# Patient Record
Sex: MALE | Race: WHITE | Employment: FULL TIME | URBAN - METROPOLITAN AREA
[De-identification: names, ages, dates, MRNs, and addresses within clinical notes are randomized per-mention and may not be internally consistent; named-entity substitution may affect disease eponyms.]

---

## 2020-03-16 ENCOUNTER — VIRTUAL VISIT (OUTPATIENT)
Dept: FAMILY MEDICINE | Facility: OTHER | Age: 65
End: 2020-03-16

## 2020-03-16 ENCOUNTER — OFFICE VISIT - HEALTHEAST (OUTPATIENT)
Dept: FAMILY MEDICINE | Facility: CLINIC | Age: 65
End: 2020-03-16

## 2020-03-16 DIAGNOSIS — R05.9 COUGH: ICD-10-CM

## 2020-03-19 NOTE — PROGRESS NOTES
"Date: 2020 05:40:20  Clinician: Isabella Jarvis  Clinician NPI: [id number]  Patient: Ted Banuelos  Patient : 1955  Patient Address: [address] [place] [address]  Patient Phone: [phone]  Visit Protocol: URI  Patient Summary:  Ted is a 64 year old ( : 1955 ) male who initiated a Visit for COVID-19 (Coronavirus) evaluation and screening. When asked the question \"Please sign me up to receive news, health information and promotions. \", Ted responded \"Yes\".    Ted states his symptoms started suddenly 7-9 days ago.   His symptoms consist of malaise, a headache, rhinitis, enlarged lymph nodes, chills, wheezing, a cough, and tooth pain. He is experiencing mild difficulty breathing with activities but can speak normally in full sentences. Ted also feels feverish.   Symptom details     Nasal secretions: The color of his mucus is yellow and white.    Cough: Ted coughs every 5-10 minutes and his cough is not more bothersome at night. Phlegm comes into his throat when he coughs. He does not believe his cough is caused by post-nasal drip. The color of the phlegm is white and yellow.     Temperature: His current temperature is 92.4 degrees Fahrenheit.     Wheezing: Ted has not ever been diagnosed with asthma. The wheezing interferes with his normal daily activities.    Headache: He states the headache is moderate (4-6 on a 10 point pain scale).     Tooth pain: The tooth pain is not caused by a cavity, recent dental work, or other mouth problems.      Ted denies having ear pain, facial pain or pressure, myalgias, sore throat, and nasal congestion. He also denies having recent facial or sinus surgery in the past 60 days, double sickening (worsening symptoms after initial improvement), having a sinus infection within the past year, and taking antibiotic medication for the symptoms.   Precipitating events  He has not recently been exposed to someone with influenza. " Ted has been in close contact with the following high risk individuals: adults 65 or older and children under the age of 5.   Pertinent COVID-19 (Coronavirus) information  Ted has not traveled internationally or to the areas where COVID-19 (Coronavirus) is widespread in the last 14 days before the start of his symptoms.   Ted has not had close contact with a suspected or laboratory-confirmed COVID-19 patient within 14 days of symptom onset.   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   Triage Point(s) temporarily suspended for COVID-19 (Coronavirus) screening  Ted reported the following symptoms which were previously protocol referral points. These protocol referral points have temporarily been removed for purposes of COVID-19 (Coronavirus) screening.   Wheezing that keeps Ted from doing daily activities   Pertinent medical history  Ted does not need a return to work/school note.   Weight: 180 lbs   Ted does not smoke or use smokeless tobacco.   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 To do this follow these instructions:  You should go to one of our dedicated testing centers as soon as possible during the hours below at one of these locations:   Walk-in Care: [facility] at [address] [address], [place] [address]. Hours: M-F 7am - 6pm, Sat-Sun 8am -- 3pm  Walk-in Care: [facility] at [address] [place] [address]. Hours: M-F 7am - 6pm, Sat-Sun 8am -- 3pm  [place] [address], [place] [address]. Hours: M-F 11am -- 7pm, Sat-Sun 9am-4pm   What to expect:   When you arrive please come michelle in the parking lot.  Call [phone] and let them know which of the four clinics you are at, description of your car and where you are parked. Mention you did an [facility] visit and were sent for testing.  They will add you to the queue to get your test (you will stay in your car the entire time).  On that phone call you will give them the information to register your for the visit.  You will then be met by a provider who will perform a brief assessment in your car and collect samples to send for Corona Virus 19, influenza and possibly RSV.  You will be given patient information about respiratory illnesses and instructions. You with the results if you are not on mychart.   Isolate Yourself:   Isolate yourself while traveling.  Do Not allow any visitors within 6 feet.  Do Not go to work or school.  Do Not go to Yaz[employer],  centers, shopping, or other public places.  Do Not shake hands.  Avoid close contact with others (hugging, kissing).  Protect Others:  Cover Your Mouth and Nose with a mask, disposable tissue or wash cloth to avoid spreading germs to others.  Wash your hands and face frequently with soap and water   Fever Medicines:   For fever relief, take acetaminophen or ibuprofen.  Treat fevers above 101deg F (38.3deg C) to lower fevers and make you more comfortable.   Acetaminophen (e.g., Tylenol): Take 650 mg (two 325 mg pills) by mouth every 4-6 hours as needed of regular strength Tylenol or 1,000 mg (two 500 mg pills) every 8 hours as needed of Extra Strength Tylenol.   Ibuprofen (e.g., Motrin, Advil): Take 400 mg (two 200 mg pills) by mouth every 6 hours as needed.   Acetaminophen is thought to be safer than ibuprofen or naproxen for people over 65 years old. Acetaminophen is in many OTC and prescription medicines. It might be in more than one medicine that you are taking. You need to be  careful and not take an overdose. Before taking any medicine, read all the instructions on the package.  Caution -NSAIDs (e.g., ibuprofen, naproxen): Do not take nonsteroidal anti-inflammatory drugs (NSAIDs) if you have stomach problems, kidney disease, heart failure, or other contraindications to using this type of medicine. Do not take NSAID medicines for over 7 days without consulting your PCP. Do not take NSAID medicines if you are pregnant. Do not take NSAID medicines if you are also taking blood thinners.   Call Back If: Breathing difficulty develops or you become worse.  Thank you for limiting contact with others, wearing a simple mask to cover your cough, practice good hand hygiene habits and accessing our virtual services where possible to limit the spread of this virus.  For more information about COVID19 and options for caring for yourself at home, please visit the CDC website at https://www.cdc.gov/coronavirus/2019-ncov/about/steps-when-sick.html  For more options for care at [facility], please visit our website at [email or web address]    Diagnosis: Cough  Diagnosis ICD: R05

## 2020-03-22 ENCOUNTER — COMMUNICATION - HEALTHEAST (OUTPATIENT)
Dept: SCHEDULING | Facility: CLINIC | Age: 65
End: 2020-03-22

## 2021-01-20 ENCOUNTER — THERAPY VISIT (OUTPATIENT)
Dept: PHYSICAL THERAPY | Facility: CLINIC | Age: 66
End: 2021-01-20
Payer: OTHER MISCELLANEOUS

## 2021-01-20 DIAGNOSIS — S96.912A: ICD-10-CM

## 2021-01-20 PROCEDURE — 97110 THERAPEUTIC EXERCISES: CPT | Mod: GP | Performed by: PHYSICAL THERAPIST

## 2021-01-20 PROCEDURE — 97161 PT EVAL LOW COMPLEX 20 MIN: CPT | Mod: GP | Performed by: PHYSICAL THERAPIST

## 2021-01-20 NOTE — PROGRESS NOTES
[facility] Initial Evaluation  Subjective:  The history is provided by the patient. No  was used.   Patient Health History  Ted Banuelos being seen for Left Ankle and Left knee injury.     Problem began: 1/4/2021.   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   Red flags:  None as reported by patient.  Medical allergies: none.   Surgeries: pain, muscle relaxants.    Current medications:  None.    Current occupation is [employer] .   Primary job tasks include:  Prolonged standing, repetitive tasks, pushing/pulling, lifting/carrying and driving.                  Therapist Generated HPI Evaluation         Type of problem:  Left ankle (and Left Knee).    This is a new condition.  Condition occurred with:  A fall/slip.  Where condition occurred: at work.  Patient reports pain:  Anterior, lateral and posterior.  Pain is described as aching and cramping and is intermittent.  Pain radiates to:  Thigh (posterior thigh).   Since onset symptoms are gradually improving.  Associated symptoms:  Loss of motion/stiffness. Symptoms are exacerbated by walking, weight bearing and standing        Restrictions due to condition include:  Currently not working due to present treatment (pt follows up with MD on the 18thof February).  Barriers include:  None as reported by patient.                        Objective:  System    Ankle/Foot Evaluation  ROM:    AROM:    Dorsiflexion:  Left:   15*  Right:   10*  Plantarflexion:  Left:  60*    Right:  60*  Inversion:  Left:  40     Right:  40  Eversion:  30     Right:  30      PROM:                Pain: no pain with AROM.     Strength:    Dorsiflexion:  Left: 4/5   Strong/pain free    Pain:   Right: 5/5   Strong/pain free  Pain:  Plantarflexion: Left: 4+/5   Strong/pain free  Pain:   Right: 5/5  Strong/pain free  Pain:  Inversion:Left: 4/5   Strong/pain free  Pain:     Right: 5/5  Strong/pain free  Pain:  Eversion:Left: 4/5  Strong/pain free  Pain:  Right: 5/5  Strong/pain free  Pain:  Flexion Great Toe:Left: 5/5  Strong/pain free  Pain:  Right: 5/5   Strong/pain free  Pain:  Extension Great Toe:Left: 5/5  Strong/pain free  Pain:  Right: 5/5  Strong/pain free  Pain:                                                               Hip Evaluation  HIP AROM:  AROM:   Left Hip:     Normal    Right Hip:                      Hip Strength:      Extension:  Left: 3+/5   Pain:strong/pain freeRight: 4-/5     Pain: strong/pain free                               Knee Evaluation:  ROM:    AROM      Extension:  Left: 0    Right:  0  Flexion: Left: 120    Right: 140    Pain: pain with L knee flexion.     Strength:     Extension:  Left: 4+/5   Strong/pain free  Pain:      Right: 4+/5   Strong/pain free  Pain:  Flexion:  Left: 4+/5   Strong/pain free  Pain:      Right: 4+/5   Strong/pain free  Pain:    Quad Set Left:  Fair and delayed    Pain: -   Quad Set Right:  Good and WNL    Pain: -                  General     ROS    Assessment/Plan:    Patient is a 65 year old male with left side knee and left side ankle complaints.    Patient has the following significant findings with corresponding treatment plan.                Diagnosis 1:  Left Knee and Ankle Pain secondary to fall  Pain -  manual therapy, self management, education and home program  Decreased ROM/flexibility - manual therapy and therapeutic exercise  Decreased strength - therapeutic exercise and therapeutic activities  Impaired gait - gait training  Decreased function - therapeutic activities    Therapy Evaluation Codes:   1) History comprised of:   Personal factors that impact the plan of care:      None.    Comorbidity factors that impact the plan of care are:      none.     Medications impacting care: None.  2) Examination of Body Systems comprised of:   Body structures and functions that impact the plan of  care:      Ankle and Knee.   Activity limitations that impact the plan of care are:      standing, walking, lifting.  3) Clinical presentation characteristics are:   Stable/Uncomplicated.  4) Decision-Making    Low complexity using standardized patient assessment instrument and/or measureable assessment of functional outcome.  Cumulative Therapy Evaluation is: Low complexity.    Previous and current functional limitations:  (See Goal Flow Sheet for this information)    Short term and Long term goals: (See Goal Flow Sheet for this information)     Communication ability:  Patient appears to be able to clearly communicate and understand verbal and written communication and follow directions correctly.  Treatment Explanation - The following has been discussed with the patient:   RX ordered/plan of care  Anticipated outcomes  Possible risks and side effects  This patient would benefit from PT intervention to resume normal activities.   Rehab potential is excellent.    Frequency:  1 X week, once daily  Duration:  for 8 weeks  Discharge Plan:  Achieve all LTG.  Independent in home treatment program.  Reach maximal therapeutic benefit.    Please refer to the daily flowsheet for treatment today, total treatment time and time spent performing 1:1 timed codes.

## 2021-01-20 NOTE — LETTER
[facility] PHYSICAL THERAPY  [address]  [facility] [address]  [phone]    2021    Re: Ted Banuelos   :   1955  MRN:  [id number]   REFERRING PHYSICIAN:   Yan Key    [facility] PHYSICAL THERAPY  Date of Initial Evaluation: 21  Visits:  Rxs Used: 1  Reason for Referral:  Left ankle strain    EVALUATION SUMMARY    [facility] Initial Evaluation    Subjective:  The history is provided by the patient. No  was used.     Patient Health History  Ted Banuelos being seen for Left Ankle and Left knee injury.     Problem began: 2021.   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   Red flags:  None as reported by patient.  Medical allergies: none.   Surgeries: pain, muscle relaxants.    Current medications:  None.    Current occupation is [employer] .   Primary job tasks include:  Prolonged standing, repetitive tasks, pushing/pulling, lifting/carrying and driving.                  Therapist Generated HPI Evaluation  Type of problem:  Left ankle (and Left Knee).  This is a new condition.  Condition occurred with:  A fall/slip.  Where condition occurred: at work.  Patient reports pain:  Anterior, lateral and posterior.  Pain is described as aching and cramping and is intermittent.  Pain radiates to:  Thigh (posterior thigh).   Since onset symptoms are gradually improving.  Associated symptoms:  Loss of motion/stiffness.     Symptoms are exacerbated by walking, weight bearing and standing        Restrictions due to condition include:  Currently not working due to present treatment (pt follows up with MD on the ).  Barriers include:  None as reported by patient.              Objective:    Ankle/Foot Evaluation  ROM:    AROM:    Dorsiflexion:  Left:   15*  Right:    10*  Plantarflexion:  Left:  60*    Right:  60*  Inversion:  Left:  40     Right:  40  Eversion:  30     Right:  30    PROM:  Pain: no pain with AROM.     Strength:    Dorsiflexion:  Left: 4/5   Strong/pain free    Pain:   Right: 5/5   Strong/pain free  Pain:  Plantarflexion: Left: 4+/5   Strong/pain free  Pain:   Right: 5/5  Strong/pain free  Pain:  Inversion:Left: 4/5  Strong/pain free  Pain:     Right: 5/5  Strong/pain free  Pain:  Eversion:Left: 4/5  Strong/pain free  Pain:  Right: 5/5  Strong/pain free  Pain:  Flexion Great Toe:Left: 5/5  Strong/pain free  Pain:  Right: 5/5   Strong/pain free  Pain:  Extension Great Toe:Left: 5/5  Strong/pain free  Pain:  Right: 5/5  Strong/pain free  Pain:       Hip Evaluation  HIP AROM:  AROM:   Left Hip:     Normal    Right Hip:       Hip Strength:    Extension:  Left: 3+/5   Pain:strong/pain freeRight: 4-/5     Pain: strong/pain free      Knee Evaluation:  ROM:    AROM  Extension:  Left: 0    Right:  0  Flexion: Left: 120    Right: 140    Pain: pain with L knee flexion.     Strength:   Extension:  Left: 4+/5   Strong/pain free  Pain:      Right: 4+/5   Strong/pain free  Pain:  Flexion:  Left: 4+/5   Strong/pain free  Pain:      Right: 4+/5   Strong/pain free  Pain:    Quad Set Left:  Fair and delayed    Pain: -   Quad Set Right:  Good and WNL    Pain: -    Assessment/Plan:    Patient is a 65 year old male with left side knee and left side ankle complaints.    Patient has the following significant findings with corresponding treatment plan.                  Diagnosis 1:  Left Knee and Ankle Pain secondary to fall  Pain -  manual therapy, self management, education and home program  Decreased ROM/flexibility - manual therapy and therapeutic exercise    Decreased strength - therapeutic exercise and therapeutic activities  Impaired gait - gait training  Decreased function - therapeutic activities    Therapy Evaluation Codes:   1) History comprised of:   Personal factors that  impact the plan of care:      None.    Comorbidity factors that impact the plan of care are:      none.     Medications impacting care: None.  2) Examination of Body Systems comprised of:   Body structures and functions that impact the plan of care:      Ankle and Knee.   Activity limitations that impact the plan of care are:      standing, walking, lifting.  3) Clinical presentation characteristics are:   Stable/Uncomplicated.  4) Decision-Making    Low complexity using standardized patient assessment instrument and/or measureable assessment of functional outcome.  Cumulative Therapy Evaluation is: Low complexity.    Previous and current functional limitations:  (See Goal Flow Sheet for this information)    Short term and Long term goals: (See Goal Flow Sheet for this information)     Communication ability:  Patient appears to be able to clearly communicate and understand verbal and written communication and follow directions correctly.    Treatment Explanation - The following has been discussed with the patient:   RX ordered/plan of care  Anticipated outcomes  Possible risks and side effects    This patient would benefit from PT intervention to resume normal activities.   Rehab potential is excellent.  Frequency:  1 X week, once daily  Duration:  for 8 weeks  Discharge Plan:  Achieve all LTG.  Independent in home treatment program.  Reach maximal therapeutic benefit.    Please refer to the daily flowsheet for treatment today, total treatment time and time spent performing 1:1 timed codes.     Thank you for your referral.    INQUIRIES  Therapist: Jennifer Nation DPT   [facility] PHYSICAL THERAPY  [address]  [facility] [address]  Phone: [phone]  Fax: [phone]

## 2021-01-26 ENCOUNTER — THERAPY VISIT (OUTPATIENT)
Dept: PHYSICAL THERAPY | Facility: CLINIC | Age: 66
End: 2021-01-26
Payer: OTHER MISCELLANEOUS

## 2021-01-26 DIAGNOSIS — S96.912D STRAIN OF LEFT ANKLE, SUBSEQUENT ENCOUNTER: ICD-10-CM

## 2021-01-26 PROCEDURE — 97110 THERAPEUTIC EXERCISES: CPT | Mod: GP | Performed by: PHYSICAL THERAPIST

## 2021-02-03 ENCOUNTER — THERAPY VISIT (OUTPATIENT)
Dept: PHYSICAL THERAPY | Facility: CLINIC | Age: 66
End: 2021-02-03
Payer: OTHER MISCELLANEOUS

## 2021-02-03 DIAGNOSIS — S96.912D STRAIN OF LEFT ANKLE, SUBSEQUENT ENCOUNTER: ICD-10-CM

## 2021-02-03 PROCEDURE — 97110 THERAPEUTIC EXERCISES: CPT | Mod: GP | Performed by: PHYSICAL THERAPIST

## 2021-02-03 PROCEDURE — 97112 NEUROMUSCULAR REEDUCATION: CPT | Mod: GP | Performed by: PHYSICAL THERAPIST

## 2021-03-18 PROBLEM — S96.912A LEFT ANKLE STRAIN: Status: RESOLVED | Noted: 2021-01-20 | Resolved: 2021-03-18

## 2021-03-18 NOTE — PROGRESS NOTES
Discharge Note    Progress reporting period is from initial evaluation date (please see noted date below) to Feb 3, 2021.  Linked Episodes   Type: Episode: Status: Noted: Resolved: Last update: Updated by:   PHYSICAL THERAPY left Leg and Foot 1/20/2021 Active 1/20/2021  2/3/2021 10:24 AM Roselia Nation, PT      Comments:       Ted failed to follow up and current status is unknown.  Please see information below for last relevant information on current status.  Patient seen for 3 visits.    SUBJECTIVE  Subjective changes noted by patient:  Patient reports things have been going good. Been doing really good with walking. Exercises going well. The only place he is still dealing with throbbing pain is on the ankle bone. Pain mainly bottom of the foot.   .  Current pain level is 0/10.     Previous pain level was  6/10.   Changes in function:  Yes (See Goal flowsheet attached for changes in current functional level)  Adverse reaction to treatment or activity: None    OBJECTIVE  Changes noted in objective findings: ankle AROM full and painfree, Negative anterior/ posterior drawer, negative varus / valgus stress test, DF 5/5, inversion 5/5, eversion 5/5, PF strength SL right 25 deg, left 21 reps. knee flexion 130 deg,      ASSESSMENT/PLAN  Diagnosis: Left Leg and Foot   Updated problem list and treatment plan:   Pain - HEP  STG/LTGs have been met or progress has been made towards goals:  Yes, please see goal flowsheet for most current information  Assessment of Progress: current status is unknown.    Last current status: Pt is progressing as expected   Self Management Plans:  HEP  I have re-evaluated this patient and find that the nature, scope, duration and intensity of the therapy is appropriate for the medical condition of the patient.  Ted continues to require the following intervention to meet STG and LTG's:  HEP.    Recommendations:  Discharge with current home program.  Patient to follow up with MD as  needed.    Please refer to the daily flowsheet for treatment today, total treatment time and time spent performing 1:1 timed codes.

## 2021-06-06 NOTE — PROGRESS NOTES
SUBJECTIVE: Here for curbside evaluation for coronaviruse referred through [facility].     Reports no new symptoms.     OBJECTIVE: no apparent distress  Eyes appear normal  Mucous membranes moist  Non diaphoretic.   No increased work of breathing   Mental status appears normal/affect normal       1. Cough  COVID-19 Virus PCR-NP    over the counter meds and isolation discussed until results in.   Education information given. Time of visit was 15 minutes more than half in coordination of care and counseling regarding COVID and self-isolation

## 2021-06-06 NOTE — PATIENT INSTRUCTIONS - HE
You are being tested for Corona Virus 19    We will call you with your results.    Isolate Yourself:    Isolate yourself while traveling.    Do Not allow any visitors within 6 feet.    Do Not go to work or school.    Do Not go to Christianity,  centers, shopping, or other public places.    Do Not shake hands.    Avoid close contact with others (hugging, kissing).    Protect Others:    Cover Your Mouth and Nose with a mask, disposable tissue or wash cloth to avoid spreading germs to others.    Wash your hands and face frequently with soap and water    Call Back If: Breathing difficulty develops or you become worse.    For more information about COVID19 and options for caring for yourself at home, please visit the CDC website at https://www.cdc.gov/coronavirus/2019-ncov/about/steps-when-sick.html  For more options for care at [facility], please visit our website at [email or web address]

## 2021-06-07 NOTE — TELEPHONE ENCOUNTER
Coronavirus (COVID-19) Notification    Reason for call  Notify of Negative COVID-19 lab result, assess symptoms,  review [facility] recommendations    Lab Result    Lab test 2019-nCoV rRt-PCR  Oropharyngeal AND/OR nasopharyngeal swabs were NEGATIVE for 2019-nCoV RNA    Unable to leave message advising patient return this call.      {Name]  Haresh Serna, ROSAN

## 2021-07-03 NOTE — ADDENDUM NOTE
Addendum Note by Lorena Christine at 3/16/2020  7:30 AM     Author: Lorena Christine Service: -- Author Type:     Filed: 3/19/2020  5:14 PM Encounter Date: 3/16/2020 Status: Signed    : Lorena Christine ()    Addended by: LORENA CHRISTINE on: 3/19/2020 05:14 PM        Modules accepted: Orders

## 2021-07-03 NOTE — ADDENDUM NOTE
Addendum Note by Ronaldo Hannah at 3/16/2020  7:30 AM     Author: Ronaldo Hannah Service: -- Author Type:     Filed: 3/17/2020 11:25 AM Encounter Date: 3/16/2020 Status: Signed    : Ronaldo Hannah ()    Addended by: RONALDO HANNAH on: 3/17/2020 11:25 AM        Modules accepted: Orders